# Patient Record
(demographics unavailable — no encounter records)

---
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
## 2024-11-04 NOTE — INTERPRETER SERVICES
[FreeTextEntry3] :  Patient declined  service. Patient felt comfortable speaking with provider in shared language, Kiswahili.
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
## 2024-11-04 NOTE — INTERPRETER SERVICES
[FreeTextEntry3] :  Patient declined  service. Patient felt comfortable speaking with provider in shared language, Mongolian.

## 2024-11-04 NOTE — PHYSICAL EXAM
[Normal Voice/Communication] : normal voice/communication [Normal] : no acute distress, well nourished, well developed and well-appearing [Normal Sclera/Conjunctiva] : normal sclera/conjunctiva [No Respiratory Distress] : no respiratory distress  [No Rash] : no rash [No Focal Deficits] : no focal deficits [Normal Gait] : normal gait [Speech Grossly Normal] : speech grossly normal [Alert and Oriented x3] : oriented to person, place, and time [de-identified] : +melasma on b/l cheeks [de-identified] : +very teary during interview
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
## 2024-11-04 NOTE — INTERPRETER SERVICES
[FreeTextEntry3] :  Patient declined  service. Patient felt comfortable speaking with provider in shared language, Latvian.

## 2024-11-04 NOTE — REVIEW OF SYSTEMS
[Insomnia] : insomnia [Anxiety] : anxiety [Depression] : depression [Negative] : Heme/Lymph [Suicidal] : not suicidal [FreeTextEntry8] : irregular periods
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
## 2024-11-04 NOTE — PHYSICAL EXAM
[Normal Voice/Communication] : normal voice/communication [Normal] : no acute distress, well nourished, well developed and well-appearing [Normal Sclera/Conjunctiva] : normal sclera/conjunctiva [No Respiratory Distress] : no respiratory distress  [No Rash] : no rash [No Focal Deficits] : no focal deficits [Normal Gait] : normal gait [Speech Grossly Normal] : speech grossly normal [Alert and Oriented x3] : oriented to person, place, and time [de-identified] : +melasma on b/l cheeks [de-identified] : +very teary during interview

## 2024-11-04 NOTE — ASSESSMENT
[FreeTextEntry1] :  Case discussed and seen with Dr. Ahumada and Tammie Linares  RTC in 2 weeks for f/u depression/anxiety
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
## 2024-11-04 NOTE — PHYSICAL EXAM
[Normal Voice/Communication] : normal voice/communication [Normal] : no acute distress, well nourished, well developed and well-appearing [Normal Sclera/Conjunctiva] : normal sclera/conjunctiva [No Respiratory Distress] : no respiratory distress  [No Rash] : no rash [No Focal Deficits] : no focal deficits [Normal Gait] : normal gait [Speech Grossly Normal] : speech grossly normal [Alert and Oriented x3] : oriented to person, place, and time [de-identified] : +melasma on b/l cheeks [de-identified] : +very teary during interview
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
